# Patient Record
Sex: MALE | Race: WHITE | NOT HISPANIC OR LATINO | Employment: OTHER | URBAN - METROPOLITAN AREA
[De-identification: names, ages, dates, MRNs, and addresses within clinical notes are randomized per-mention and may not be internally consistent; named-entity substitution may affect disease eponyms.]

---

## 2017-02-24 ENCOUNTER — ALLSCRIPTS OFFICE VISIT (OUTPATIENT)
Dept: OTHER | Facility: OTHER | Age: 77
End: 2017-02-24

## 2018-01-12 NOTE — MISCELLANEOUS
History of Present Illness  TCM Communication [facility]: The patient is being contacted for follow-up after hospitalization  He was hospitalized at [facility]  The dates of hospitalization: 10 days, date of admission: 2/2/16, date of discharge: 2/12/16  Diagnosis: Pneumonia  He was discharged to a rehabilitation center  Medications were not reviewed today  He did not schedule a follow up appointment  He refused a follow up appointment due to discharge to rehab [facility]  Communication performed and completed by A  [facility] LPN      Active Problems    1  Abdominal pain, acute, right upper quadrant (789 01,338 19) (R10 11)   2  Aortic aneurysm (441 9) (I71 9)   3  Benign prostatic hypertrophy with lower urinary tract symptoms (LUTS) (600 01) (N40 1)   4  Bladder cancer (188 9) (C67 9)   5  CAD (coronary atherosclerotic disease) (414 00) (I25 10)   6  Carotid artery stenosis (433 10) (I65 29)   7  Cholelithiases (574 20) (K80 20)   8  Chronic obstructive pulmonary disease (496) (J44 9)   9  Chronic respiratory failure with hypoxia (518 83,799 02) (J96 11)   10  Chronic rhinitis (472 0) (J31 0)   11  Cognitive deficit due to Parkinson's disease (294 9,332 0) (F06 8,G20)   12  Cough (786 2) (R05)   13  Depression with anxiety (300 4) (F41 8)   14  Diastolic congestive heart failure (428 30,428 0) (I50 30)   15  Elevated liver enzymes (790 5) (R74 8)   16  Fatty liver (571 8) (K76 0)   17  Fibromyalgia (729 1) (M79 7)   18  Hearing Loss (389 9)   19  Hyperlipidemia (272 4) (E78 5)   20  Hypertension (401 9) (I10)   21  Hypoxia (799 02) (R09 02)   22  Interstitial lung disease (515) (J84 9)   23  Muscle cramps (729 82) (R25 2)   24  Needs flu shot (V04 81) (Z23)   25  Obstructive sleep apnea (327 23) (G47 33)   26  Osteoarthritis (715 90) (M19 90)   27  Parkinson's disease (332 0) (G20)   28  Pressure ulcer (707 00,707 20) (L89 90)   29  Respiratory failure with hypoxia (518 81) (J96 91)   30   Rheumatoid arthritis (714 0) (M06 9)   31  S/P deep brain stimulator placement (V45 89) (Z96 89)   32  Skin cancer (173 90) (C44 90)   33  Skin lesion of face (709 9) (L98 9)   34  Stroke, 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)   35  Tremor (781 0) (R25 1)   36  UTI (urinary tract infection) (599 0) (N39 0)   37  Venous insufficiency (459 81) (I87 2)    Past Medical History    1  History of Laura Of [employer] [address] [facility] (Not Motorcycle (G681 3)   2  Former smoker (V15 82) (Q39 061)   3  History of Fungal Dermatological Conditions (686 9)   4  History of Herpes zoster (053 9) (B02 9)   5  History of chest pain (V13 89) (Z87 898)   6  History of crescendo angina (V12 59) (Z86 79)   7  History of transient cerebral ischemia (V12 54) (Z86 73)   8  History of Lump Under The Skin   9  History of Palpitations (785 1) (R00 2)   10  History of Stroke Syndrome (436)    Surgical History    1  History of Arterial Catheterization   2  History of CABG   3  History of Cystoscopy Bladder Tumor (596 9)   4  Recurrent history of Cystoscopy With Fulguration   5  History of Diagnostic Cystoscopy   6  History of Small Bowel Resection    Family History    1  Family history of Cancer    2  Family history of Tuberculosis    3  Family history of Polyps Of The Sigmoid Colon    Social History    · Denied: History of Alcohol   · Caffeine Use   · Denied: History of Drug Use   · Former smoker (V15 82) (Y11 819)   · Marital History - Currently    · Denied: History of Smoker, Current Status Unknown    Current Meds   1  Albuterol Sulfate (2 5 MG/3ML) 0 083% Inhalation Nebulization Solution; use  1/2 to 1 vial   via nebulizer q 4 hrs PRN for chest tightness, wheezing, SOB; Therapy: 87IUW8616 to (Last Rx:29Apr2015)  Requested for: 29Apr2015 Ordered   2  AmLODIPine Besylate 5 MG Oral Tablet; TAKE 1 TABLET BY MOUTH EVERY DAY AS   DIRECTED; Therapy: 46JYV3845 to (Evaluate:12Jan2017)  Requested for: 24XSQ1534; Last   Rx:18Jan2016 Ordered   3   Aspirin EC 81 MG Oral Tablet Delayed Release; TAKE 1 TABLET DAILY; Therapy: (Recorded:29Jan2015) to Recorded   4  Atorvastatin Calcium 20 MG Oral Tablet; TAKE 1 TABLET DAILY AT BEDTIME; Therapy: 09WHL3691 to (Evaluate:50Crr1858)  Requested for: 15Aoz5447; Last   Rx:34Pcn7479 Ordered   5  Brovana 15 MCG/2ML Inhalation Nebulization Solution; INHALE THE CONTENTS OF 1   VIAL TWO TIMES DAILY IN THE MORNING AND EVENING VIA STANDARD JET   NEBULIZER AS DIRECTED; Therapy: (Recorded:16Jul2015) to Recorded   6  Budesonide 0 5 MG/2ML Inhalation Suspension; USE 1 UNIT DOSE VIA NEBULIZER   TWO TIMES A DAY; Therapy: (Recorded:16Jul2015) to Recorded   7  Fexofenadine HCl - 180 MG Oral Tablet; TAKE 1 TABLET DAILY; Therapy: 20CRS1710 to (Evaluate:05Jun2016)  Requested for: 82NBX3469; Last   Rx:75Quu1914 Ordered   8  Fish Oil 1200 MG Oral Capsule; take 1 capsule daily; Therapy: (Recorded:29Jan2015) to Recorded   9  FLUoxetine HCl - 20 MG Oral Tablet; TAKE 1 TABLET 3 times daily  Requested for:   36Yyf0511; Last Rx:83Yty0656 Ordered   10  Fluticasone Propionate 50 MCG/ACT Nasal Suspension; USE 2 SPRAYS IN EACH    NOSTRIL ONCE DAILY; Therapy: 40PWV5811 to (Evaluate:05Jun2016)  Requested for: 51MKZ5414; Last    Rx:82Hwq4187 Ordered   11  Folic Acid 827 MCG Oral Tablet; TAKE 1 TABLET DAILY AS DIRECTED; Therapy: (Recorded:29Jan2015) to Recorded   12  Furosemide 40 MG Oral Tablet; take 1 tablet by mouth once in AM   1/2 in PM;    Therapy: 71ODC7028 to (Evaluate:11Diu3496)  Requested for: 96Fte2986; Last    Rx:61Upa0825 Ordered   13  Ibuprofen TABS; Take as needed for arthritis; Therapy: (Recorded:16Jul2015) to Recorded   14  Ipratropium Bromide 0 02 % Inhalation Solution; USE 1 UNIT DOSE IN NEBULIZER 4    TIMES DAILY; Therapy: 94XIC0198 to (Iona Doran)  Requested for: 65LLZ4147; Last    Rx:26Zfj3269 Ordered   15   Nitrostat 0 4 MG Sublingual Tablet Sublingual; DISSOLVE 1 TABLET UNDER THE    TONGUE AS NEEDED FOR CHEST PAIN;    Therapy: 72TBH7507 to (Evaluate:24Jan2015)  Requested for: 58OJX0875 Recorded   16  Probiotic CAPS; take 1 capsule daily; Therapy: (Recorded:27Jlh2552) to Recorded   17  ROPINIRole HCl ER 4 MG Oral Tablet Extended Release 24 Hour; take one tablet by    mouth every day; Therapy: 86GYU5721 to (Evaluate:21Mar2016)  Requested for: 42Nwv5922; Last    Rx:16Shn9921 Ordered   18  VESIcare 10 MG Oral Tablet; Therapy: 67XDI6995 to Recorded    Allergies    1  Adhesive Tape TAPE   2  Bactrim TABS   3  Carb/Phenyl-12 SUSP   4  Darvon-N TABS   5  Levofloxacin TABS   6  Lipitor TABS   7  Lodosyn   8  Lotensin TABS   9  Penicillins   10  Percodan TABS   11  Sinemet   12  Sulfa Drugs    13  Adhesive Tape    Health Management  Health Maintenance   COLONOSCOPY; every 1 year; Last 72QOL2342; Next Due: 46SGT3178; Overdue    Future Appointments    Date/Time Provider Specialty Site   03/31/2016 03:40 PM HARJINDER Lawrence   03/16/2016 10:00 AM HARJINDER Franks  Pulmonary Medicine [facility]   04/07/2016 12:45 PM [facility] Neurology [facility]   06/01/2016 03:00 PM Maycol Conklin DO Cardiology  [facility]     Signatures   Electronically signed by : NICO Hameed; Feb 16 2016  2:32PM EST                       (Author)    Electronically signed by :  Esteban Townsend MD; Feb 17 2016  1:52PM EST                       (Review)

## 2018-01-12 NOTE — MISCELLANEOUS
Message   Recorded as Task   Date: 01/28/2016 05:38 PM, Created By: Nasima Connors   Task Name: Medical Complaint Callback   Assigned To: Elle Harvey   Regarding Patient: Jonathan Hannah, Status: Active   Comment:    Nasima Connors - 28 Jan 2016 5:38 PM     TASK CREATED  Caller: Hermelindo Gonzales, Spouse  Needs lab order for upcoming appt  Place and I can mail to him  Plan  Chronic obstructive pulmonary disease, Hyperlipidemia, Hypertension, Rheumatoid  arthritis    · (1) CBC/PLT/DIFF; Status:Active; Requested for:99Zyz9826;    · (1) COMPREHENSIVE METABOLIC PANEL; Status:Active; Requested for:04Ain6179;    · (1) LIPID PANEL, FASTING; Status:Active; Requested for:88Mkx9035;    · (1) MAGNESIUM; Status:Active;  Requested for:41Rsc4297;     Signatures   Electronically signed by : HARJINDER Saldana ; Feb 4 2016 12:37PM EST                       (Author)

## 2018-01-13 VITALS
DIASTOLIC BLOOD PRESSURE: 58 MMHG | BODY MASS INDEX: 20.56 KG/M2 | HEIGHT: 67 IN | TEMPERATURE: 98.3 F | SYSTOLIC BLOOD PRESSURE: 106 MMHG | HEART RATE: 72 BPM | OXYGEN SATURATION: 98 % | WEIGHT: 131 LBS

## 2018-01-15 NOTE — MISCELLANEOUS
Discussion/Summary  Discussion Summary: We will follow-up after goes home from rehab  Last seen by me 10/2015  No appt pending  History of Present Illness  TCM Communication [facility]: The patient is being contacted for follow-up after hospitalization  He was hospitalized at [facility]  The dates of hospitalization: 3, date of admission: 67/2016, date of discharge: 6/10/216  Diagnosis: COPD Chronic obstructive Pulmonary Disease, Congestive heart Failure  He was discharged to a rehabilitation center, [facility]  Medications were not reviewed today  He did not schedule a follow up appointment  He refused a follow up appointment due to Discharge to [address] [place] [address]  Counseling was provided to patient's family  Wife  Topics counseled included importance of compliance with treatment  Communication performed and completed by CLIFF Chen      Active Problems    1  Abdominal pain, acute, right upper quadrant (789 01,338 19) (R10 11)   2  Aortic aneurysm (441 9) (I71 9)   3  Benign prostatic hypertrophy with lower urinary tract symptoms (LUTS) (600 01) (N40 1)   4  Bladder cancer (188 9) (C67 9)   5  CAD (coronary atherosclerotic disease) (414 00) (I25 10)   6  Carotid artery stenosis (433 10) (I65 29)   7  Cholelithiases (574 20) (K80 20)   8  Chronic obstructive pulmonary disease (496) (J44 9)   9  Chronic respiratory failure with hypoxia (518 83,799 02) (J96 11)   10  Chronic rhinitis (472 0) (J31 0)   11  Cognitive deficit due to Parkinson's disease (294 9,332 0) (F06 8,G20)   12  Cough (786 2) (R05)   13  Depression with anxiety (300 4) (F41 8)   14  Diastolic congestive heart failure (428 30,428 0) (I50 30)   15  Elevated liver enzymes (790 5) (R74 8)   16  Fatty liver (571 8) (K76 0)   17  Fibromyalgia (729 1) (M79 7)   18  Hearing Loss (389 9)   19  Hyperlipidemia (272 4) (E78 5)   20  Hypertension (401 9) (I10)   21  Hypoxia (799 02) (R09 02)   22  Interstitial lung disease (515) (J84 9)   23  Muscle cramps (729 82) (R25 2)   24  Needs flu shot (V04 81) (Z23)   25  Obstructive sleep apnea (327 23) (G47 33)   26  Osteoarthritis (715 90) (M19 90)   27  Parkinson's disease (332 0) (G20)   28  Pressure ulcer (707 00,707 20) (L89 90)   29  Respiratory failure with hypoxia (518 81) (J96 91)   30  Rheumatoid arthritis (714 0) (M06 9)   31  S/P deep brain stimulator placement (V45 89) (Z96 89)   32  Skin cancer (173 90) (C44 90)   33  Skin lesion of face (709 9) (L98 9)   34  Stroke, embolic (450 78) (J53 5)   35  Tremor (781 0) (R25 1)   36  UTI (urinary tract infection) (599 0) (N39 0)   37  Venous insufficiency (459 81) (I87 2)    Past Medical History    1  History of Laura Of [employer] On [address] [place] [address] (Not Motorcycle (M956 1)   2  Former smoker (V15 82) (D50 062)   3  History of Fungal Dermatological Conditions (686 9)   4  History of Herpes zoster (053 9) (B02 9)   5  History of chest pain (V13 89) (Z87 898)   6  History of crescendo angina (V12 59) (Z86 79)   7  History of transient cerebral ischemia (V12 54) (Z86 73)   8  History of Lump Under The Skin   9  History of Palpitations (785 1) (R00 2)   10  History of Stroke Syndrome (436)    Surgical History    1  History of Arterial Catheterization   2  History of CABG   3  History of Cystoscopy Bladder Tumor (596 9)   4  Recurrent history of Cystoscopy With Fulguration   5  History of Diagnostic Cystoscopy   6  History of Small Bowel Resection    Family History  Mother    1  Family history of Cancer  Father    2  Family history of Tuberculosis  Brother    3  Family history of Polyps Of The Sigmoid Colon    Social History    · Denied: History of Alcohol   · Caffeine Use   · Denied: History of Drug Use   · Former smoker (V15 82) (B65 099)   · Marital History - Currently    · Denied: History of Smoker, Current Status Unknown    Current Meds   1   Albuterol Sulfate (2 5 MG/3ML) 0 083% Inhalation Nebulization Solution; use  1/2 to 1 vial   via nebulizer q 4 hrs PRN for chest tightness, wheezing, SOB; Therapy: 55JVV0468 to (Last Rx:29Apr2015)  Requested for: 29Apr2015 Ordered   2  AmLODIPine Besylate 5 MG Oral Tablet; TAKE 1 TABLET BY MOUTH EVERY DAY AS   DIRECTED; Therapy: 26TSI4427 to (Evaluate:12Jan2017)  Requested for: 12HQD4345; Last   Rx:18Jan2016 Ordered   3  Aspirin EC 81 MG Oral Tablet Delayed Release; TAKE 1 TABLET DAILY; Therapy: (Recorded:29Jan2015) to Recorded   4  Atorvastatin Calcium 20 MG Oral Tablet; TAKE 1 TABLET DAILY AT BEDTIME; Therapy: 40CPP0532 to (Evaluate:16Jul2016)  Requested for: 53Jyr6423; Last   Rx:22Jul2015 Ordered   5  Brovana 15 MCG/2ML Inhalation Nebulization Solution; INHALE THE CONTENTS OF 1   VIAL TWO TIMES DAILY IN THE MORNING AND EVENING VIA STANDARD JET   NEBULIZER AS DIRECTED; Therapy: (Recorded:16Jul2015) to Recorded   6  Budesonide 0 5 MG/2ML Inhalation Suspension; USE 1 UNIT DOSE VIA NEBULIZER   TWO TIMES A DAY; Therapy: (Recorded:16Jul2015) to Recorded   7  Fexofenadine HCl - 180 MG Oral Tablet; TAKE 1 TABLET DAILY; Therapy: 47UOZ3323 to (Evaluate:05Jun2016)  Requested for: 19IEW1150; Last   Rx:21Mfi8970 Ordered   8  Fish Oil 1200 MG Oral Capsule; take 1 capsule daily; Therapy: (Recorded:29Jan2015) to Recorded   9  FLUoxetine HCl - 20 MG Oral Tablet; TAKE 1 TABLET 3 times daily  Requested for:   83Fpi5703; Last Rx:00Rgl6233 Ordered   10  Fluticasone Propionate 50 MCG/ACT Nasal Suspension; USE 2 SPRAYS IN EACH    NOSTRIL ONCE DAILY; Therapy: 66LOE1949 to (Evaluate:05Jun2016)  Requested for: 51VWZ2102; Last    Rx:25Wbx2352 Ordered   11  Folic Acid 959 MCG Oral Tablet; TAKE 1 TABLET DAILY AS DIRECTED; Therapy: (Recorded:29Jan2015) to Recorded   12  Furosemide 40 MG Oral Tablet; take 1 tablet by mouth once in AM   1/2 in PM;    Therapy: 01OII8917 to (Evaluate:65Rzt6983)  Requested for: 06Fhf8683; Last    Rx:42Qsb6909 Ordered   13  Ibuprofen TABS; Take as needed for arthritis;     Therapy: (Recorded:10Sgy1243) to Recorded   14  Ipratropium Bromide 0 02 % Inhalation Solution; USE 1 UNIT DOSE IN NEBULIZER 4    TIMES DAILY; Therapy: 46JOE1997 to (Marlin Dates)  Requested for: 25JHZ7627; Last    Rx:23Jpc4868 Ordered   15  Nitrostat 0 4 MG Sublingual Tablet Sublingual; DISSOLVE 1 TABLET UNDER THE    TONGUE AS NEEDED FOR CHEST PAIN;    Therapy: 41FQG7152 to (Evaluate:24Jan2015)  Requested for: 69MFN1932 Recorded   16  Probiotic CAPS; take 1 capsule daily; Therapy: (Recorded:92Osv4654) to Recorded   17  ROPINIRole HCl ER 4 MG Oral Tablet Extended Release 24 Hour; take one tablet by    mouth every day; Therapy: 46HHG9986 to (Evaluate:21Mar2016)  Requested for: 81Brj0828; Last    Rx:99Wmc4472 Ordered   18  VESIcare 10 MG Oral Tablet; Therapy: 12OME2630 to Recorded    Allergies    1  Adhesive Tape TAPE   2  Bactrim TABS   3  Carb/Phenyl-12 SUSP   4  Darvon-N TABS   5  Levofloxacin TABS   6  Lipitor TABS   7  Lodosyn   8  Lotensin TABS   9  Penicillins   10  Percodan TABS   11  Sinemet   12  Sulfa Drugs    13  Adhesive Tape    Health Management  Health Maintenance   COLONOSCOPY; every 1 year; Last 67YJK2230; Next Due: 14IDD9711;  Overdue    Future Appointments    Date/Time Provider Specialty Site   07/14/2016 10:30 AM Dipti Davenport MD Neurology [address] [place] [address]     Signatures   Electronically signed by : HARJINDER Iraheta ; Jun 13 2016  1:28PM EST                       (Author)

## 2018-01-16 NOTE — MISCELLANEOUS
Message   Recorded as Task   Date: 07/25/2016 01:39 PM, Created By: Rosi Metz   Task Name: Medical Complaint Callback   Assigned To: Naren Marie   Regarding Patient: Roni Menchaca, Status: Active   Comment:    Rosi Metz - 25 Jul 2016 1:39 PM     TASK CREATED  Caller: Jose G Bello, Spouse  Sarai requesting letter from you stating that Mich is not medically fit to drive  He has not driven car for 2 years and is in    a nursing home  She needs this so that she can take him of the her auto insurance policy          if questions, call her     at [phone]   Letter written      Signatures   Electronically signed by : HARJINDER Goins ; Jul 27 2016  4:51PM EST                       (Author)

## 2018-01-16 NOTE — MISCELLANEOUS
Message   Date: 15 Apr 2016 11:11 AM EST, Recorded By: Terrence Iniguez For: Rubia Mcneal   Caller: [facility]   Reason: Other   Pc from [facility] who is seeing patient today reporting pt has pain is right calf   no redness, no swelling had been in [facility] for pneumonia and then in the [facility] for rehab   case opened this week under PCP Dr parra who is unavilable today and needs to report to an office so called here  We have not seen pt since 2/1/16 --- ask her if anyone covering for PCP--told not    Yeni Samaniego referred pt to ED to be checked for calf pain and to r/o DVT  She will refer patient to ED  Active Problems    1  Abdominal pain, acute, right upper quadrant (789 01,338 19) (R10 11)   2  Aortic aneurysm (441 9) (I71 9)   3  Benign prostatic hypertrophy with lower urinary tract symptoms (LUTS) (600 01) (N40 1)   4  Bladder cancer (188 9) (C67 9)   5  CAD (coronary atherosclerotic disease) (414 00) (I25 10)   6  Carotid artery stenosis (433 10) (I65 29)   7  Cholelithiases (574 20) (K80 20)   8  Chronic obstructive pulmonary disease (496) (J44 9)   9  Chronic respiratory failure with hypoxia (518 83,799 02) (J96 11)   10  Chronic rhinitis (472 0) (J31 0)   11  Cognitive deficit due to Parkinson's disease (294 9,332 0) (F06 8,G20)   12  Cough (786 2) (R05)   13  Depression with anxiety (300 4) (F41 8)   14  Diastolic congestive heart failure (428 30,428 0) (I50 30)   15  Elevated liver enzymes (790 5) (R74 8)   16  Fatty liver (571 8) (K76 0)   17  Fibromyalgia (729 1) (M79 7)   18  Hearing Loss (389 9)   19  Hyperlipidemia (272 4) (E78 5)   20  Hypertension (401 9) (I10)   21  Hypoxia (799 02) (R09 02)   22  Interstitial lung disease (515) (J84 9)   23  Muscle cramps (729 82) (R25 2)   24  Needs flu shot (V04 81) (Z23)   25  Obstructive sleep apnea (327 23) (G47 33)   26  Osteoarthritis (715 90) (M19 90)   27  Parkinson's disease (332 0) (G20)   28  Pressure ulcer (707 00,707 20) (L89 90)   29  Respiratory failure with hypoxia (518 81) (J96 91)   30  Rheumatoid arthritis (714 0) (M06 9)   31  S/P deep brain stimulator placement (V45 89) (Z96 89)   32  Skin cancer (173 90) (C44 90)   33  Skin lesion of face (709 9) (L98 9)   34  Stroke, embolic (885 34) (I71 5)   35  Tremor (781 0) (R25 1)   36  UTI (urinary tract infection) (599 0) (N39 0)   37  Venous insufficiency (459 81) (I87 2)    Current Meds   1  Albuterol Sulfate (2 5 MG/3ML) 0 083% Inhalation Nebulization Solution; use  1/2 to 1   vial via nebulizer q 4 hrs PRN for chest tightness, wheezing, SOB; Therapy: 09EAF9600 to (Last Rx:29Apr2015)  Requested for: 29Apr2015 Ordered   2  AmLODIPine Besylate 5 MG Oral Tablet; TAKE 1 TABLET BY MOUTH EVERY DAY AS   DIRECTED; Therapy: 80WGB2678 to (Evaluate:12Jan2017)  Requested for: 94RFS2824; Last   Rx:18Jan2016 Ordered   3  Aspirin EC 81 MG Oral Tablet Delayed Release; TAKE 1 TABLET DAILY; Therapy: (Recorded:29Jan2015) to Recorded   4  Atorvastatin Calcium 20 MG Oral Tablet; TAKE 1 TABLET DAILY AT BEDTIME; Therapy: 70SVW0081 to (Evaluate:16Jul2016)  Requested for: 24Tne5521; Last   Rx:80Jnk5892 Ordered   5  Brovana 15 MCG/2ML Inhalation Nebulization Solution; INHALE THE CONTENTS OF 1   VIAL TWO TIMES DAILY IN THE MORNING AND EVENING VIA STANDARD JET   NEBULIZER AS DIRECTED; Therapy: (Recorded:09Crr6934) to Recorded   6  Budesonide 0 5 MG/2ML Inhalation Suspension; USE 1 UNIT DOSE VIA NEBULIZER   TWO TIMES A DAY; Therapy: (Recorded:96Dzn4215) to Recorded   7  Fexofenadine HCl - 180 MG Oral Tablet; TAKE 1 TABLET DAILY; Therapy: 84TVF6724 to (Evaluate:05Jun2016)  Requested for: 66MDD8569; Last   Rx:60Qvm3491 Ordered   8  Fish Oil 1200 MG Oral Capsule; take 1 capsule daily; Therapy: (Recorded:29Jan2015) to Recorded   9  FLUoxetine HCl - 20 MG Oral Tablet; TAKE 1 TABLET 3 times daily  Requested for:   53Chb3867; Last Rx:78Dto6030 Ordered   10   Fluticasone Propionate 50 MCG/ACT Nasal Suspension; USE 2 SPRAYS IN EACH    NOSTRIL ONCE DAILY; Therapy: 98IMW2380 to (Evaluate:05Jun2016)  Requested for: 32SXN4880; Last    Rx:38Yqg3012 Ordered   11  Folic Acid 735 MCG Oral Tablet; TAKE 1 TABLET DAILY AS DIRECTED; Therapy: (Recorded:29Jan2015) to Recorded   12  Furosemide 40 MG Oral Tablet; take 1 tablet by mouth once in AM   1/2 in PM;    Therapy: 34DFX3724 to (Evaluate:16Jul2016)  Requested for: 05Xtt9616; Last    Rx:63Wtz5434 Ordered   13  Ibuprofen TABS; Take as needed for arthritis; Therapy: (Recorded:99Bwf1347) to Recorded   14  Ipratropium Bromide 0 02 % Inhalation Solution; USE 1 UNIT DOSE IN NEBULIZER 4    TIMES DAILY; Therapy: 61HAV3358 to (Curtis Pellant)  Requested for: 56ELV0401; Last    Rx:40Moz0319 Ordered   15  Nitrostat 0 4 MG Sublingual Tablet Sublingual; DISSOLVE 1 TABLET UNDER THE    TONGUE AS NEEDED FOR CHEST PAIN;    Therapy: 14EKT1282 to (Evaluate:24Jan2015)  Requested for: 03BWG2855 Recorded   16  Probiotic CAPS; take 1 capsule daily; Therapy: (Recorded:50Jvz6275) to Recorded   17  ROPINIRole HCl ER 4 MG Oral Tablet Extended Release 24 Hour; take one tablet by    mouth every day; Therapy: 18UVV5395 to (Evaluate:21Mar2016)  Requested for: 28Wla7325; Last    Rx:34Qel0909 Ordered   18  VESIcare 10 MG Oral Tablet; Therapy: 04EXA0053 to Recorded    Allergies    1  Adhesive Tape TAPE   2  Bactrim TABS   3  Carb/Phenyl-12 SUSP   4  Darvon-N TABS   5  Levofloxacin TABS   6  Lipitor TABS   7  Lodosyn   8  Lotensin TABS   9  Penicillins   10  Percodan TABS   11  Sinemet   12  Sulfa Drugs    13   Adhesive Tape    Signatures   Electronically signed by : Mariana Cook, ; Apr 15 2016 11:16AM EST                       (Author)

## 2018-01-18 NOTE — MISCELLANEOUS
History of Present Illness    The patient is being contacted for follow-up after hospitalization  Hospitalization The patient was discharged on 6/10/16  He has a moderate risk for readmission  He was discharged to a SNF for long-term care, States wife spoke to physician at facility about long term placement for patient  The patient's status is permanent resident  The facility name is: [facility]  Phone number: [phone]  The best contact is CHarge nurse  I spoke with Vazquez Leon  The patient is on HF pathway  Dr Brian Rubio  a report was called and the patient was identified as a HF patient  The patient's discharge weight is 160 hospital The patient's weight today is 158 4 on 6/10/16 at facility  Patient is experiencing the following symptoms: shortness of breath with moderate activity   The patient is currently asymptomatic  no acute symptoms  On 02 6L  Treatment Questions: will be sure to weigh daily now  Diet ordered is 2 gm Na, the fluid restriction is being followed, staff is aware of symptoms to report, discharge medications were reconciled with the facility provider/PCP, the patient/family is compliant with diet, daily weights are being done and a plan is in place for who to call for worsening symptoms  The patient has the following appointments:    need to arrange  Staff re-education topics include diet, need for daily weights, fluid restriction, symptoms to report, importance of compliance with treatment and activities of daily living  Current Meds   1  Albuterol Sulfate (2 5 MG/3ML) 0 083% Inhalation Nebulization Solution; use  1/2 to 1 vial   via nebulizer q 4 hrs PRN for chest tightness, wheezing, SOB; Therapy: 70XOC6209 to (Last Rx:29Apr2015)  Requested for: 29Apr2015 Ordered   2  AmLODIPine Besylate 5 MG Oral Tablet; TAKE 1 TABLET BY MOUTH EVERY DAY AS   DIRECTED; Therapy: 24BEH7049 to (Evaluate:12Jan2017)  Requested for: 63OSE6651; Last   Rx:18Jan2016 Ordered   3  Aspirin EC 81 MG Oral Tablet Delayed Release; TAKE 1 TABLET DAILY; Therapy: (Recorded:29Jan2015) to Recorded   4  Atorvastatin Calcium 20 MG Oral Tablet; TAKE 1 TABLET DAILY AT BEDTIME; Therapy: 59MOY0272 to (Evaluate:50Zww2690)  Requested for: 92Zsg7353; Last   Rx:14Xer0700 Ordered   5  Brovana 15 MCG/2ML Inhalation Nebulization Solution; INHALE THE CONTENTS OF 1   VIAL TWO TIMES DAILY IN THE MORNING AND EVENING VIA STANDARD JET   NEBULIZER AS DIRECTED; Therapy: (Recorded:16Jul2015) to Recorded   6  Budesonide 0 5 MG/2ML Inhalation Suspension; USE 1 UNIT DOSE VIA NEBULIZER   TWO TIMES A DAY; Therapy: (Recorded:16Jul2015) to Recorded   7  Fexofenadine HCl - 180 MG Oral Tablet; TAKE 1 TABLET DAILY; Therapy: 77PWC3926 to (Evaluate:05Jun2016)  Requested for: 29GAN0622; Last   Rx:[id number] Ordered   8  Fish Oil 1200 MG Oral Capsule; take 1 capsule daily; Therapy: (Recorded:29Jan2015) to Recorded   9  FLUoxetine HCl - 20 MG Oral Tablet; TAKE 1 TABLET 3 times daily  Requested for:   36Zed0329; Last Rx:35Sfm0690 Ordered   10  Fluticasone Propionate 50 MCG/ACT Nasal Suspension; USE 2 SPRAYS IN EACH    NOSTRIL ONCE DAILY; Therapy: 91IVD8432 to (Evaluate:05Jun2016)  Requested for: 54KGK9788; Last    Rx:84Bkf1863 Ordered   11  Folic Acid 717 MCG Oral Tablet; TAKE 1 TABLET DAILY AS DIRECTED; Therapy: (Recorded:29Jan2015) to Recorded   12  Furosemide 40 MG Oral Tablet; take 1 tablet by mouth once in AM   1/2 in PM;    Therapy: 49PIK5566 to (Evaluate:65Lpf5652)  Requested for: 14Lzq0557; Last    Rx:17Xcp7020 Ordered   13  Ibuprofen TABS; Take as needed for arthritis; Therapy: (Recorded:52Ynb7672) to Recorded   14  Ipratropium Bromide 0 02 % Inhalation Solution; USE 1 UNIT DOSE IN NEBULIZER 4    TIMES DAILY; Therapy: 72FYH7930 to (Richard Desai)  Requested for: 37FVL6825; Last    Rx:47Nfi2793 Ordered   15   Nitrostat 0 4 MG Sublingual Tablet Sublingual; DISSOLVE 1 TABLET UNDER THE    TONGUE AS NEEDED FOR CHEST PAIN;    Therapy: 91WJX0490 to (Evaluate:24Jan2015)  Requested for: 86FIE7360 Recorded   16  Probiotic CAPS; take 1 capsule daily; Therapy: (Recorded:79Gij4497) to Recorded   17  ROPINIRole HCl ER 4 MG Oral Tablet Extended Release 24 Hour; take one tablet by    mouth every day; Therapy: 66BYO0321 to (Evaluate:21Mar2016)  Requested for: 32Nvp2687; Last    Rx:18Ohi4080 Ordered   18  VESIcare 10 MG Oral Tablet; Therapy: 90AIJ5681 to Recorded    Allergies    1  Adhesive Tape TAPE   2  Bactrim TABS   3  Carb/Phenyl-12 SUSP   4  Darvon-N TABS   5  Levofloxacin TABS   6  Lipitor TABS   7  Lodosyn   8  Lotensin TABS   9  Penicillins   10  Percodan TABS   11  Sinemet   12  Sulfa Drugs    13   Adhesive Tape    Future Appointments    Date/Time Provider Specialty Site   07/14/2016 10:30 AM Ina Colon MD Neurology [facility]     Signatures   Electronically signed by : Milli Banks, ; Jun 13 2016  2:27PM EST                       (Author)

## 2018-01-18 NOTE — MISCELLANEOUS
History of Present Illness  A call was made to the facility  The contact name and phone number is  Ivory Zhu Nurse at [facility]  Status Check:  weights stable  Patient is experiencing the following symptoms: shortness of breath with usual activity   The patient is not experiencing signs of heart failure  at baseline- no acute symptoms- still requires 02 6L ATC   Concerns expressed today consisted of: none  HFCC Additional Notes: No discharge date as yet        Signatures   Electronically signed by : Agnes Lanier, ; Jul 7 2016  2:20PM EST                       (Author)